# Patient Record
Sex: FEMALE | Race: WHITE | NOT HISPANIC OR LATINO
[De-identification: names, ages, dates, MRNs, and addresses within clinical notes are randomized per-mention and may not be internally consistent; named-entity substitution may affect disease eponyms.]

---

## 2019-02-27 ENCOUNTER — NON-APPOINTMENT (OUTPATIENT)
Age: 39
End: 2019-02-27

## 2019-02-27 ENCOUNTER — APPOINTMENT (OUTPATIENT)
Dept: PULMONOLOGY | Facility: CLINIC | Age: 39
End: 2019-02-27
Payer: COMMERCIAL

## 2019-02-27 ENCOUNTER — LABORATORY RESULT (OUTPATIENT)
Age: 39
End: 2019-02-27

## 2019-02-27 VITALS
HEART RATE: 121 BPM | DIASTOLIC BLOOD PRESSURE: 60 MMHG | OXYGEN SATURATION: 98 % | WEIGHT: 194 LBS | BODY MASS INDEX: 31.18 KG/M2 | HEIGHT: 66 IN | SYSTOLIC BLOOD PRESSURE: 134 MMHG

## 2019-02-27 DIAGNOSIS — Z83.3 FAMILY HISTORY OF DIABETES MELLITUS: ICD-10-CM

## 2019-02-27 DIAGNOSIS — J34.89 OTHER SPECIFIED DISORDERS OF NOSE AND NASAL SINUSES: ICD-10-CM

## 2019-02-27 DIAGNOSIS — J01.01 ACUTE RECURRENT MAXILLARY SINUSITIS: ICD-10-CM

## 2019-02-27 DIAGNOSIS — R09.81 NASAL CONGESTION: ICD-10-CM

## 2019-02-27 DIAGNOSIS — Z82.49 FAMILY HISTORY OF ISCHEMIC HEART DISEASE AND OTHER DISEASES OF THE CIRCULATORY SYSTEM: ICD-10-CM

## 2019-02-27 DIAGNOSIS — Z3A.25 25 WEEKS GESTATION OF PREGNANCY: ICD-10-CM

## 2019-02-27 DIAGNOSIS — Z78.9 OTHER SPECIFIED HEALTH STATUS: ICD-10-CM

## 2019-02-27 DIAGNOSIS — K21.9 GASTRO-ESOPHAGEAL REFLUX DISEASE W/OUT ESOPHAGITIS: ICD-10-CM

## 2019-02-27 PROBLEM — Z00.00 ENCOUNTER FOR PREVENTIVE HEALTH EXAMINATION: Status: ACTIVE | Noted: 2019-02-27

## 2019-02-27 PROCEDURE — 99245 OFF/OP CONSLTJ NEW/EST HI 55: CPT | Mod: 25

## 2019-02-27 PROCEDURE — 94010 BREATHING CAPACITY TEST: CPT

## 2019-02-27 RX ORDER — LEVOTHYROXINE SODIUM 0.07 MG/1
75 TABLET ORAL
Qty: 30 | Refills: 0 | Status: ACTIVE | COMMUNITY
Start: 2018-12-28

## 2019-02-27 NOTE — CONSULT LETTER
[FreeTextEntry1] : Thank you for allowing me to consult on LILIANA CALVERT  for persistent cough.  Please see my note below.\par \par  [FreeTextEntry3] : Thank you very much for allowing me to consult on your patient.  If you have any questions, please do not hesitate to contact me.\par \par Sincerely,\par \par Jose Rafael Roldan MD\par Pulmonary and Sleep Medicine\par [facility]\par  [DrArt  ___] : Dr. BLANC [DrArt ___] : Dr. BLANC

## 2019-02-27 NOTE — REASON FOR VISIT
[Consultation] : a consultation visit [Asthma] : asthma [Cough] : cough [FreeTextEntry1] : Fluid in Lung

## 2019-02-27 NOTE — ASSESSMENT
[FreeTextEntry1] : above was discussed at length with the patient who has an excellent understanding of the issues\par

## 2019-02-27 NOTE — HISTORY OF PRESENT ILLNESS
[Difficulty Breathing During Exertion] : worsened dyspnea on exertion [Regional Soft Tissue Swelling Both Lower Extremities] : stable lower extremity edema [Fever] : resolved fever [Wt Gain ___ Lbs] : recent [unfilled] ~Upound(s) weight gain [4  -  Somewhat severe] : 4, somewhat severe [Class III - Marked Limitation in Activity] : III [Does not check] : The patient is not checking peak flow at home [___ Pack Year History] : [unfilled] pack year history [Feelings Of Weakness On Exertion] : worsened exercise intolerance [Cough] : worsened coughing [Wheezing] : worsened wheezing [Chest Pain Or Discomfort] : worsened chest pain [Never] : was never a smoker [Cold] : cold weather [URI] : upper respiratory tract infection [Exercise] : exercise [Adherent] : the patient is adherent with ~his/her~ medication regimen [Wt Loss ___ Lbs] : no recent weight loss [Oxygen] : the patient uses no supplemental oxygen [More Frequent Use Needed Recently] : Patient reports no recent increase in frequency of [FreeTextEntry1] : I was asked to consult on this patient by Dr. Cedeño for persistent cough\par The patient is a 38-year-old lifelong nonsmoker with past medical history significant for tachycardia status post ablation (could not give further detail)who is 25 weeks pregnant with her third child and has been coughing for 5 weeks. She had one day of fever but none since she was prescribed a Z-Jesse earlier on in the course for clear phlegm but now when she coughs most of it his saliva although lately she's been having cough induced emesis. Her sputum is now dark yellow and also has deep yellow mucus from her nasal passages. The cough is consistent and is worse when she is outside. She has significant left-sided chest pain when she coughs she had chills yesterday but she has not had any fevers since the initial temperature. She does have significant reflux more so than during her last pregnancies. She Complains of dyspnea on exertion and also complaints of palpitations as well as tachycardia. 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 She did did have asthma and because of these symptoms she saw her obstetrician that her high risk OB referred her here. She continues to have a hacking cough that is admittedly pretty violent. She is very drug adverse. Because of the coughing she has been unable to sleep. She has tried raising the head of the bed without success. She did not get a flu shot

## 2019-02-27 NOTE — PHYSICAL EXAM
[Well Groomed] : well groomed [General Appearance - In No Acute Distress] : no acute distress [Low Lying Soft Palate] : low lying soft palate [Elongated Uvula] : elongated uvula [Enlarged Base of the Tongue] : enlargement of the base of the tongue [Erythema] : erythema of the pharynx [IV] : IV [Neck Appearance] : the appearance of the neck was normal [] : the neck was supple [Neck Cervical Mass (___cm)] : no neck mass was observed [Jugular Venous Distention Increased] : there was no jugular-venous distention [Heart Sounds] : normal S1 and S2 [Murmurs] : no murmurs present [Respiration, Rhythm And Depth] : normal respiratory rhythm and effort [Auscultation Breath Sounds / Voice Sounds] : lungs were clear to auscultation bilaterally [Abdomen Soft] : soft [Abnormal Walk] : normal gait [Nail Clubbing] : no clubbing of the fingernails [Cyanosis, Localized] : no localized cyanosis [Petechial Hemorrhages (___cm)] : no petechial hemorrhages [FreeTextEntry1] : no edema [Skin Color & Pigmentation] : normal skin color and pigmentation [Cranial Nerves] : cranial nerves 2-12 were intact [No Focal Deficits] : no focal deficits [Oriented To Time, Place, And Person] : oriented to person, place, and time [Affect] : the affect was normal [Memory Recent] : recent memory was not impaired

## 2019-03-12 DIAGNOSIS — R05 COUGH: ICD-10-CM

## 2019-03-12 DIAGNOSIS — R69 ILLNESS, UNSPECIFIED: ICD-10-CM

## 2019-09-13 ENCOUNTER — APPOINTMENT (OUTPATIENT)
Dept: NEUROLOGY | Facility: CLINIC | Age: 39
End: 2019-09-13
Payer: COMMERCIAL

## 2019-09-13 VITALS
BODY MASS INDEX: 27.32 KG/M2 | TEMPERATURE: 98.2 F | SYSTOLIC BLOOD PRESSURE: 110 MMHG | HEART RATE: 76 BPM | DIASTOLIC BLOOD PRESSURE: 73 MMHG | WEIGHT: 170 LBS | HEIGHT: 66 IN

## 2019-09-13 DIAGNOSIS — G43.909 MIGRAINE, UNSPECIFIED, NOT INTRACTABLE, W/OUT STATUS MIGRAINOSUS: ICD-10-CM

## 2019-09-13 PROCEDURE — 99244 OFF/OP CNSLTJ NEW/EST MOD 40: CPT

## 2019-09-13 RX ORDER — BENZONATATE 200 MG/1
200 CAPSULE ORAL
Qty: 45 | Refills: 3 | Status: DISCONTINUED | COMMUNITY
Start: 2019-02-27 | End: 2019-09-13

## 2019-09-13 RX ORDER — DIAZEPAM 5 MG/1
5 TABLET ORAL
Qty: 5 | Refills: 0 | Status: DISCONTINUED | COMMUNITY
Start: 2018-09-07 | End: 2019-09-13

## 2019-09-13 RX ORDER — AZITHROMYCIN 250 MG/1
250 TABLET, FILM COATED ORAL
Qty: 6 | Refills: 0 | Status: DISCONTINUED | COMMUNITY
Start: 2019-02-11 | End: 2019-09-13

## 2019-09-13 RX ORDER — OSELTAMIVIR PHOSPHATE 75 MG/1
75 CAPSULE ORAL DAILY
Qty: 10 | Refills: 1 | Status: DISCONTINUED | COMMUNITY
Start: 2019-03-12 | End: 2019-09-13

## 2019-09-13 RX ORDER — AMOXICILLIN 500 MG/1
500 CAPSULE ORAL
Qty: 21 | Refills: 0 | Status: DISCONTINUED | COMMUNITY
Start: 2018-09-07 | End: 2019-09-13

## 2019-09-13 RX ORDER — METHYLPREDNISOLONE 4 MG/1
4 TABLET ORAL AS DIRECTED
Qty: 1 | Refills: 0 | Status: DISCONTINUED | COMMUNITY
Start: 2019-03-12 | End: 2019-09-13

## 2019-09-13 RX ORDER — LEVOTHYROXINE SODIUM 0.05 MG/1
50 TABLET ORAL
Qty: 30 | Refills: 0 | Status: DISCONTINUED | COMMUNITY
Start: 2018-09-27 | End: 2019-09-13

## 2019-09-13 RX ORDER — AMOXICILLIN AND CLAVULANATE POTASSIUM 875; 125 MG/1; MG/1
875-125 TABLET, COATED ORAL TWICE DAILY
Qty: 28 | Refills: 1 | Status: DISCONTINUED | COMMUNITY
Start: 2019-02-27 | End: 2019-09-13

## 2019-09-13 RX ORDER — ESTRADIOL 2 MG/1
2 TABLET ORAL
Qty: 120 | Refills: 0 | Status: DISCONTINUED | COMMUNITY
Start: 2018-10-25 | End: 2019-09-13

## 2019-09-13 RX ORDER — METFORMIN ER 750 MG 750 MG/1
750 TABLET ORAL
Qty: 60 | Refills: 0 | Status: DISCONTINUED | COMMUNITY
Start: 2018-11-07 | End: 2019-09-13

## 2019-09-13 RX ORDER — PREDNISONE 10 MG/1
10 TABLET ORAL
Qty: 60 | Refills: 0 | Status: DISCONTINUED | COMMUNITY
Start: 2018-09-07 | End: 2019-09-13

## 2019-09-13 NOTE — ASSESSMENT
[FreeTextEntry1] : Ms. Rand is a 39-year-old woman with a family history of cerebral aneurysm.\par I agree with an MRA of the head for screening.\par She has a history of migraine but mostly had headaches only during her pregnancies. If she has any recurrent headaches she will followup with me for further management.\par Multiple joint pains - she has an appointment with a rheumatologist.\par

## 2019-09-13 NOTE — HISTORY OF PRESENT ILLNESS
[FreeTextEntry1] : Ms. Rand is a 39-year-old woman who has a family history of cerebral aneurysm. She has one uncle who  of cerebral aneurysm at age 16.   His neice also has a history of cerebral aneurysm. She has never had screening imaging of her cerebral blood vessels.\par She has a history of 8 miscarriages. More recently she was able to carry 3 children to term with the use of anticoagulation. She takes aspirin and Lovenox. She has hypothyroidism on Synthroid.  She was told that she had a protein C and S deficiency and the MTHFR gene mutation.\par She has a history of migraine most of the time it is during her pregnancies. She has severe throbbing pain which is retro-orbital and usually behind one eye more than the other. Severity is 10/10. She has photophobia, phonophobia. The headaches last for several days.\par For many years she has had episodes where her head feels like it is swaying which lasts for a few seconds. They can happen anytime while sitting doing nothing or while walking.\par She's had multiple joint pains since 2019 and plans on seeing a rheumatologist.\par She has 3 children - 3-year-old, 19-month-old, 3-month-old\par

## 2019-09-13 NOTE — REVIEW OF SYSTEMS
[Dizziness] : dizziness [Tension Headache] : tension-type headaches [Negative] : Endocrine [Eye Pain] : eye pain [Red Eyes] : red eyes [Chest Pain] : chest pain [Palpitations] : palpitations [Shortness Of Breath] : shortness of breath [Easy Bruising] : a tendency for easy bruising [FreeTextEntry5] : trouble breathing,irregular heart beat [de-identified] : off balance [FreeTextEntry2] : fatigue,weakness,fever,night time sweats [FreeTextEntry8] : frequent urination,irregular periods,night time sweats [de-identified] : exess bleeding

## 2019-09-24 ENCOUNTER — RESULT REVIEW (OUTPATIENT)
Age: 39
End: 2019-09-24

## 2019-10-16 ENCOUNTER — RESULT REVIEW (OUTPATIENT)
Age: 39
End: 2019-10-16

## 2019-10-25 ENCOUNTER — APPOINTMENT (OUTPATIENT)
Dept: PLASTIC SURGERY | Facility: CLINIC | Age: 39
End: 2019-10-25
Payer: COMMERCIAL

## 2019-10-25 VITALS
TEMPERATURE: 98.1 F | DIASTOLIC BLOOD PRESSURE: 85 MMHG | BODY MASS INDEX: 26.52 KG/M2 | WEIGHT: 165 LBS | SYSTOLIC BLOOD PRESSURE: 134 MMHG | HEIGHT: 66 IN | OXYGEN SATURATION: 97 % | HEART RATE: 84 BPM | RESPIRATION RATE: 20 BRPM

## 2019-10-25 DIAGNOSIS — Z86.718 PERSONAL HISTORY OF OTHER VENOUS THROMBOSIS AND EMBOLISM: ICD-10-CM

## 2019-10-25 DIAGNOSIS — R79.1 ABNORMAL COAGULATION PROFILE: ICD-10-CM

## 2019-10-25 DIAGNOSIS — Z87.59 PERSONAL HISTORY OF OTHER COMPLICATIONS OF PREGNANCY, CHILDBIRTH AND THE PUERPERIUM: ICD-10-CM

## 2019-10-25 DIAGNOSIS — Z87.19 PERSONAL HISTORY OF OTHER DISEASES OF THE DIGESTIVE SYSTEM: ICD-10-CM

## 2019-10-25 PROCEDURE — 99204 OFFICE O/P NEW MOD 45 MIN: CPT

## 2019-10-25 NOTE — REVIEW OF SYSTEMS
[Fever] : fever [Chills] : chills [Feeling Tired] : feeling tired [Recent Weight Gain (___ Lbs)] : recent [unfilled] ~Ulb weight gain [Chest Pain] : chest pain [Recent Weight Loss (___ Lbs)] : recent [unfilled] ~Ulb weight loss [Arthralgias] : arthralgias [Easy Bleeding] : a tendency for easy bleeding [Joint Pain] : joint pain [Easy Bruising] : a tendency for easy bruising [Negative] : Heme/Lymph [FreeTextEntry3] : sinusitis

## 2019-10-25 NOTE — PHYSICAL EXAM
[NI] : Normal [de-identified] : supine with large diastasis hernia lower r midline and lower abdomen  scar well healed no incarceration no hsm no mass uterus is small.

## 2019-10-25 NOTE — HISTORY OF PRESENT ILLNESS
[FreeTextEntry1] : pt is 40 y/o wf  mis 9 with h/o mthfr, protein s and protein c deficiency with also hernia x 2 ventral and r inguinal and rheumatoid arthritis  currently on anticoagulation with oral medication. pt had relatively normal pregnancies and was on folate supplements and lovenox. she has 2 c section deliveries.  Pt is referred by Dr Cedeño and Dr Altman for repair of hernia and tummy tuck.  pt has severe diastasis and lower diastasis hernia and lateral defect which may be ventral or direct  inguinal.  pt has healed pfan scar. discussed abdominoplasty and dr repair in conjunction with hernia repairs . pt is high risk with clotting disorders and also has RA new diagnosis with post partum h/o pericarditis.

## 2019-10-25 NOTE — ASSESSMENT
[FreeTextEntry1] : pt is 40 y/o high risk for dvt pe event with mthfr protein c and s and does not k now what else.  she also has had an elevated rheumatoid factor and had post partum joint and pericardial issues . pt had 12 pregnancies and only children surviving were with lovenox during term and c section delivery.  no sudha partum issues for children and no birth defects.  pt taking prenatal vitamin assume it is folate not folic acid .  will speak with Tere Cedeño and Debbie Burnette re surgical abdominoplasty for exposure of hernias and repair of diastasis . pt understands she is high risk and elective abdominoplasty would not be entertained except in the presence of exposure for hernia and diastasis repairs . would like optimization of homocysteine preop

## 2019-11-05 ENCOUNTER — APPOINTMENT (OUTPATIENT)
Dept: PLASTIC SURGERY | Facility: CLINIC | Age: 39
End: 2019-11-05
Payer: COMMERCIAL

## 2019-11-05 VITALS
TEMPERATURE: 98.4 F | WEIGHT: 165 LBS | DIASTOLIC BLOOD PRESSURE: 76 MMHG | HEIGHT: 66 IN | SYSTOLIC BLOOD PRESSURE: 113 MMHG | HEART RATE: 70 BPM | RESPIRATION RATE: 20 BRPM | OXYGEN SATURATION: 97 % | BODY MASS INDEX: 26.52 KG/M2

## 2019-11-05 DIAGNOSIS — R19.8 OTHER SPECIFIED SYMPTOMS AND SIGNS INVOLVING THE DIGESTIVE SYSTEM AND ABDOMEN: ICD-10-CM

## 2019-11-05 PROCEDURE — 99215 OFFICE O/P EST HI 40 MIN: CPT | Mod: NC

## 2019-11-06 PROBLEM — R19.8 LAXITY OF ABDOMINAL WALL: Status: ACTIVE | Noted: 2019-10-25

## 2019-11-06 NOTE — PHYSICAL EXAM
[NI] : Normal [de-identified] : severe laxity and hernia rlq and midline ventral . fascia is also quite soft and weak

## 2019-11-06 NOTE — HISTORY OF PRESENT ILLNESS
[FreeTextEntry1] : pt seen for second consult and also had telephone conversation with dr Mendieta re mesh herniorrhaphy and abdominoplasty. Pt understands she is high risk for any surgery. Abdominoplasty is particularly high for dvt pe and she will get hematology consult prior.  pt has normal homocysteine level and unlikely that mthfr [id number] is responsible for misc x 9. pt has no h/o cva mi or dvt pe. she has protein c and s deficiencies and will certainly need to be treated perioperatively with anticoagulant . pt understands that surgery is needed and abdominoplasty will assist with exposure. otherwise abdominoplasty would be prohibitively risky

## 2019-12-04 ENCOUNTER — APPOINTMENT (OUTPATIENT)
Dept: PLASTIC SURGERY | Facility: HOSPITAL | Age: 39
End: 2019-12-04
Payer: SELF-PAY

## 2019-12-04 PROCEDURE — 15847 EXC SKIN ABD ADD-ON: CPT

## 2019-12-04 PROCEDURE — 15830 EXC EXCESSIVE SKIN ABDOMEN: CPT | Mod: NC,59

## 2019-12-10 ENCOUNTER — APPOINTMENT (OUTPATIENT)
Dept: PLASTIC SURGERY | Facility: CLINIC | Age: 39
End: 2019-12-10
Payer: SELF-PAY

## 2019-12-10 PROCEDURE — 99024 POSTOP FOLLOW-UP VISIT: CPT

## 2019-12-13 ENCOUNTER — APPOINTMENT (OUTPATIENT)
Dept: PLASTIC SURGERY | Facility: CLINIC | Age: 39
End: 2019-12-13
Payer: SELF-PAY

## 2019-12-13 VITALS
SYSTOLIC BLOOD PRESSURE: 118 MMHG | HEART RATE: 78 BPM | DIASTOLIC BLOOD PRESSURE: 77 MMHG | RESPIRATION RATE: 18 BRPM | TEMPERATURE: 98.9 F | OXYGEN SATURATION: 97 %

## 2019-12-13 PROCEDURE — 99024 POSTOP FOLLOW-UP VISIT: CPT

## 2019-12-27 ENCOUNTER — APPOINTMENT (OUTPATIENT)
Dept: PLASTIC SURGERY | Facility: CLINIC | Age: 39
End: 2019-12-27
Payer: SELF-PAY

## 2019-12-27 VITALS
DIASTOLIC BLOOD PRESSURE: 85 MMHG | SYSTOLIC BLOOD PRESSURE: 122 MMHG | OXYGEN SATURATION: 98 % | RESPIRATION RATE: 18 BRPM | HEIGHT: 66 IN | WEIGHT: 163 LBS | TEMPERATURE: 98.5 F | BODY MASS INDEX: 26.2 KG/M2 | HEART RATE: 95 BPM

## 2019-12-27 PROCEDURE — 99024 POSTOP FOLLOW-UP VISIT: CPT

## 2020-04-24 ENCOUNTER — APPOINTMENT (OUTPATIENT)
Dept: PLASTIC SURGERY | Facility: CLINIC | Age: 40
End: 2020-04-24

## 2021-05-17 NOTE — DISCUSSION/SUMMARY
[FreeTextEntry1] : FEV1 [2.51]L  [79]% Predicted\par [87] FEV1/FVC\par FeNO=Unable to perform None known

## 2022-05-27 ENCOUNTER — RESULT REVIEW (OUTPATIENT)
Age: 42
End: 2022-05-27

## 2022-11-01 ENCOUNTER — NON-APPOINTMENT (OUTPATIENT)
Age: 42
End: 2022-11-01

## 2022-11-01 ENCOUNTER — APPOINTMENT (OUTPATIENT)
Dept: BREAST CENTER | Facility: CLINIC | Age: 42
End: 2022-11-01

## 2022-11-01 VITALS
HEART RATE: 102 BPM | DIASTOLIC BLOOD PRESSURE: 64 MMHG | BODY MASS INDEX: 25.71 KG/M2 | HEIGHT: 66 IN | WEIGHT: 160 LBS | SYSTOLIC BLOOD PRESSURE: 117 MMHG

## 2022-11-01 DIAGNOSIS — Z87.39 PERSONAL HISTORY OF OTHER DISEASES OF THE MUSCULOSKELETAL SYSTEM AND CONNECTIVE TISSUE: ICD-10-CM

## 2022-11-01 DIAGNOSIS — N64.4 MASTODYNIA: ICD-10-CM

## 2022-11-01 DIAGNOSIS — R92.2 INCONCLUSIVE MAMMOGRAM: ICD-10-CM

## 2022-11-01 DIAGNOSIS — Z15.89 GENETIC SUSCEPTIBILITY TO OTHER DISEASE: ICD-10-CM

## 2022-11-01 DIAGNOSIS — Z86.39 PERSONAL HISTORY OF OTHER ENDOCRINE, NUTRITIONAL AND METABOLIC DISEASE: ICD-10-CM

## 2022-11-01 DIAGNOSIS — N60.19 DIFFUSE CYSTIC MASTOPATHY OF UNSPECIFIED BREAST: ICD-10-CM

## 2022-11-01 PROCEDURE — 99204 OFFICE O/P NEW MOD 45 MIN: CPT

## 2022-11-01 RX ORDER — CYANOCOBALAMIN/FOLIC AC/VIT B6 1-2.5-25MG
2.5-25-1 TABLET ORAL
Qty: 90 | Refills: 0 | Status: ACTIVE | COMMUNITY
Start: 2022-04-25

## 2022-11-01 RX ORDER — RIVAROXABAN 2.5 MG/1
2.5 TABLET, FILM COATED ORAL
Qty: 180 | Refills: 0 | Status: ACTIVE | COMMUNITY
Start: 2022-04-25

## 2022-11-01 RX ORDER — HYDROXYCHLOROQUINE SULFATE 200 MG/1
200 TABLET, FILM COATED ORAL
Qty: 60 | Refills: 0 | Status: ACTIVE | COMMUNITY
Start: 2022-09-28

## 2022-11-01 NOTE — REVIEW OF SYSTEMS
[Palpitations] : palpitations [Dysmenorrhea] : dysmenorrhea [Arthralgias] : arthralgias [Joint Swelling] : joint swelling [Anxiety] : anxiety [Negative] : Heme/Lymph

## 2022-11-01 NOTE — HISTORY OF PRESENT ILLNESS
[FreeTextEntry1] : Pt w/ h/o Bilat Br pain/tenderness worse recently\par NOT related to MP\par +Coffee 2-3cups/day\par +MTHFR gene > on Xarelto/ASAb > +h/o mult miscarriages\par S/P L Sono Core Bx (8/21): Benign\par S/P R Sono Core Bx (2011): Benign\par Had COVID (4/22) > recovered\par Got Moderna bivalent (10/22)\par No other Breast Surgery, Breast Procedures or Nipple Discharge. \par No FH Breast, Ovarian, Pancreatic Cancer or Melanoma. \par R compr views/ R targeted Sono (10/31/22): R asym effaced, Sono: NSF\par Mammo/Sono (10/24/22): HD: +nod asym R lat Br > R compr views/R targeted Sono rec'ed

## 2022-11-01 NOTE — PHYSICAL EXAM
[Normocephalic] : normocephalic [Atraumatic] : atraumatic [Supple] : supple [No Supraclavicular Adenopathy] : no supraclavicular adenopathy [No Cervical Adenopathy] : no cervical adenopathy [No Thyromegaly] : no thyromegaly [Normal Sinus Rhythm] : normal sinus rhythm [Examined in the supine and seated position] : examined in the supine and seated position [No dominant masses] : no dominant masses in right breast  [No dominant masses] : no dominant masses left breast [No Nipple Retraction] : no left nipple retraction [No Nipple Discharge] : no left nipple discharge [No Axillary Lymphadenopathy] : no left axillary lymphadenopathy [No Edema] : no edema [No Rashes] : no rashes [No Ulceration] : no ulceration [de-identified] : +dense FC tissue\par No focal tenderness\par NSF  [de-identified] : +dense FC tissue\par No focal tenderness\par NSF

## 2023-07-15 ENCOUNTER — RESULT REVIEW (OUTPATIENT)
Age: 43
End: 2023-07-15

## 2023-07-16 ENCOUNTER — RESULT REVIEW (OUTPATIENT)
Age: 43
End: 2023-07-16

## 2023-07-19 ENCOUNTER — TRANSCRIPTION ENCOUNTER (OUTPATIENT)
Age: 43
End: 2023-07-19

## 2023-07-25 ENCOUNTER — TRANSCRIPTION ENCOUNTER (OUTPATIENT)
Age: 43
End: 2023-07-25

## 2023-07-27 ENCOUNTER — APPOINTMENT (OUTPATIENT)
Dept: SURGERY | Facility: CLINIC | Age: 43
End: 2023-07-27
Payer: COMMERCIAL

## 2023-07-27 VITALS
BODY MASS INDEX: 24.59 KG/M2 | TEMPERATURE: 98.8 F | WEIGHT: 153 LBS | HEART RATE: 84 BPM | SYSTOLIC BLOOD PRESSURE: 105 MMHG | DIASTOLIC BLOOD PRESSURE: 72 MMHG | HEIGHT: 66 IN

## 2023-07-27 DIAGNOSIS — Z90.49 ACQUIRED ABSENCE OF OTHER SPECIFIED PARTS OF DIGESTIVE TRACT: ICD-10-CM

## 2023-07-27 DIAGNOSIS — Z87.19 PERSONAL HISTORY OF OTHER DISEASES OF THE DIGESTIVE SYSTEM: ICD-10-CM

## 2023-07-27 PROCEDURE — 99024 POSTOP FOLLOW-UP VISIT: CPT

## 2023-07-27 RX ORDER — RIVAROXABAN 20 MG/1
20 TABLET, FILM COATED ORAL
Refills: 0 | Status: DISCONTINUED | COMMUNITY
End: 2023-07-27

## 2023-07-27 RX ORDER — AMOXICILLIN 875 MG/1
875 TABLET, FILM COATED ORAL
Qty: 20 | Refills: 0 | Status: DISCONTINUED | COMMUNITY
Start: 2022-10-28 | End: 2023-07-27

## 2023-07-27 RX ORDER — PROGESTERONE 50 MG/ML
INJECTION, SOLUTION INTRAMUSCULAR
Refills: 0 | Status: DISCONTINUED | COMMUNITY
End: 2023-07-27

## 2023-07-27 RX ORDER — METRONIDAZOLE 500 MG/1
500 TABLET ORAL
Qty: 14 | Refills: 0 | Status: DISCONTINUED | COMMUNITY
Start: 2022-03-07 | End: 2023-07-27

## 2023-07-27 RX ORDER — CALCIUM CARBONATE/VITAMIN D3 500 MG-10
500-400 TABLET,CHEWABLE ORAL
Refills: 0 | Status: DISCONTINUED | COMMUNITY
End: 2023-07-27

## 2023-07-27 RX ORDER — ENOXAPARIN SODIUM 100 MG/ML
40 INJECTION SUBCUTANEOUS
Qty: 11 | Refills: 0 | Status: DISCONTINUED | COMMUNITY
Start: 2018-11-06 | End: 2023-07-27

## 2023-07-27 RX ORDER — CLINDAMYCIN HYDROCHLORIDE 300 MG/1
300 CAPSULE ORAL
Qty: 14 | Refills: 0 | Status: DISCONTINUED | COMMUNITY
Start: 2022-05-31 | End: 2023-07-27

## 2023-07-27 RX ORDER — BACILLUS COAGULANS/INULIN 1B-250 MG
CAPSULE ORAL
Refills: 0 | Status: ACTIVE | COMMUNITY

## 2023-07-27 RX ORDER — NYSTATIN AND TRIAMCINOLONE ACETONIDE 100000; 1 MG/G; MG/G
100000-0.1 CREAM TOPICAL
Qty: 30 | Refills: 0 | Status: DISCONTINUED | COMMUNITY
Start: 2022-05-27 | End: 2023-07-27

## 2023-07-27 RX ORDER — IRON/IRON ASP GLY/FA/MV-MIN 38 125-25-1MG
TABLET ORAL
Refills: 0 | Status: ACTIVE | COMMUNITY

## 2023-07-27 RX ORDER — UBIQUINOL 100 MG
CAPSULE ORAL
Refills: 0 | Status: ACTIVE | COMMUNITY

## 2023-07-27 RX ORDER — ALBUTEROL SULFATE 90 UG/1
108 (90 BASE) AEROSOL, METERED RESPIRATORY (INHALATION)
Qty: 8 | Refills: 0 | Status: DISCONTINUED | COMMUNITY
Start: 2019-02-11 | End: 2023-07-27

## 2023-07-27 RX ORDER — ENOXAPARIN SODIUM 40 MG/.4ML
40 INJECTION SUBCUTANEOUS
Refills: 0 | Status: DISCONTINUED | COMMUNITY
End: 2023-07-27

## 2023-07-27 NOTE — HISTORY OF PRESENT ILLNESS
[de-identified] : 43 yr old    s/p lap Appedectmy  10 dyas ago for acute appendicitis, presents feeling well, tolerating diet, denies pain and complete resolution of previous symptoms.\par She reports small amount of drainage from the  suprapubic incision. Denies fever , chills.

## 2023-07-27 NOTE — PHYSICAL EXAM
[Normal] : affect appropriate [de-identified] :  incisions  with some erythema   appears as reaction to Dermabondno real infection

## 2023-07-27 NOTE — PLAN
[FreeTextEntry1] : counseled to shower regularly and keep incisions cleana ad dry, stay out of pools and  no lifting for 1 month

## 2025-09-10 ENCOUNTER — APPOINTMENT (OUTPATIENT)
Dept: OBGYN | Facility: CLINIC | Age: 45
End: 2025-09-10
Payer: COMMERCIAL

## 2025-09-10 PROCEDURE — 76830 TRANSVAGINAL US NON-OB: CPT | Mod: 26
